# Patient Record
Sex: FEMALE | URBAN - METROPOLITAN AREA
[De-identification: names, ages, dates, MRNs, and addresses within clinical notes are randomized per-mention and may not be internally consistent; named-entity substitution may affect disease eponyms.]

---

## 2020-08-13 ENCOUNTER — LAB OUTSIDE AN ENCOUNTER (OUTPATIENT)
Dept: URBAN - METROPOLITAN AREA CLINIC 92 | Facility: CLINIC | Age: 65
End: 2020-08-13

## 2020-08-13 ENCOUNTER — OFFICE VISIT (OUTPATIENT)
Dept: URBAN - METROPOLITAN AREA TELEHEALTH 2 | Facility: TELEHEALTH | Age: 65
End: 2020-08-13
Payer: MEDICARE

## 2020-08-13 DIAGNOSIS — D50.8 ANEMIA, DUE TO INADEQUATE IRON INTAKE: ICD-10-CM

## 2020-08-13 DIAGNOSIS — K64.8 BLEEDING INTERNAL HEMORRHOIDS: ICD-10-CM

## 2020-08-13 DIAGNOSIS — R10.13 DYSPEPSIA: ICD-10-CM

## 2020-08-13 DIAGNOSIS — Z12.11 SCREENING FOR COLON CANCER: ICD-10-CM

## 2020-08-13 DIAGNOSIS — K21.9 GERD (GASTROESOPHAGEAL REFLUX DISEASE): ICD-10-CM

## 2020-08-13 PROCEDURE — 99243 OFF/OP CNSLTJ NEW/EST LOW 30: CPT | Performed by: INTERNAL MEDICINE

## 2020-08-13 PROCEDURE — G8427 DOCREV CUR MEDS BY ELIG CLIN: HCPCS | Performed by: INTERNAL MEDICINE

## 2020-08-13 PROCEDURE — 3017F COLORECTAL CA SCREEN DOC REV: CPT | Performed by: INTERNAL MEDICINE

## 2020-08-13 PROCEDURE — G8482 FLU IMMUNIZE ORDER/ADMIN: HCPCS | Performed by: INTERNAL MEDICINE

## 2020-08-13 PROCEDURE — G9622 NO UNHEAL ETOH USER: HCPCS | Performed by: INTERNAL MEDICINE

## 2020-08-13 PROCEDURE — G8420 CALC BMI NORM PARAMETERS: HCPCS | Performed by: INTERNAL MEDICINE

## 2020-08-13 PROCEDURE — 99203 OFFICE O/P NEW LOW 30 MIN: CPT | Performed by: INTERNAL MEDICINE

## 2020-08-13 PROCEDURE — G9903 PT SCRN TBCO ID AS NON USER: HCPCS | Performed by: INTERNAL MEDICINE

## 2020-08-13 PROCEDURE — 1036F TOBACCO NON-USER: CPT | Performed by: INTERNAL MEDICINE

## 2020-08-13 RX ORDER — LEVOTHYROXINE SODIUM 50 UG/1
1 TABLET IN THE MORNING ON AN EMPTY STOMACH TABLET ORAL ONCE A DAY
Status: ACTIVE | COMMUNITY

## 2020-08-13 RX ORDER — PANTOPRAZOLE SODIUM 40 MG/1
1 TABLET TABLET, DELAYED RELEASE ORAL BID
Qty: 60 TABLET | Refills: 11 | OUTPATIENT
Start: 2020-08-13

## 2020-08-13 NOTE — HPI-OTHER HISTORIES
Pt Sita is here for evaluation for anemia. . Today on 8/13/2020, pt reports that she has hemorrhoids, which do bleed on occasion, which she believes that is the cause of her sxs.  She has constant heartburn, especially she lays down.  Feels the reflux up to her throat and can trigger her asthma.  Her PCP started her on protonix 20mg, BID, it helped somewhat.  She read that it can be harmful, so she reduced the dose to nightly only.  No nausea, occasional trouble swallowing, has to drink water to get the food down.  1 Ibuprfen about 1 times per month.  Occasional left sided abdominal pain, no precipitating factors, very mild.  Allergy to dairy.  Was found to have Hgb of 9.8 (1 week ago).  She was having signficant amount of rectal bleeding.  Lot of fatigue, very low energy. . Had colonoscopy about 6 years ago, was recommended to return in 5-10 years.  . PMH: mild asthma, hypothyroid SH: No smoke/etoh FH: No colon cancer/stomach cancer .

## 2020-08-20 ENCOUNTER — LAB OUTSIDE AN ENCOUNTER (OUTPATIENT)
Dept: URBAN - METROPOLITAN AREA CLINIC 92 | Facility: CLINIC | Age: 65
End: 2020-08-20

## 2020-09-17 ENCOUNTER — TELEPHONE ENCOUNTER (OUTPATIENT)
Dept: URBAN - METROPOLITAN AREA CLINIC 96 | Facility: CLINIC | Age: 65
End: 2020-09-17

## 2020-09-17 ENCOUNTER — TELEPHONE ENCOUNTER (OUTPATIENT)
Dept: URBAN - METROPOLITAN AREA CLINIC 92 | Facility: CLINIC | Age: 65
End: 2020-09-17

## 2020-09-17 RX ORDER — PANTOPRAZOLE SODIUM 40 MG/1
1 TABLET TABLET, DELAYED RELEASE ORAL BID
Qty: 60 TABLET | Refills: 11 | Status: ACTIVE | COMMUNITY
Start: 2020-08-13

## 2020-09-17 RX ORDER — SODIUM, POTASSIUM,MAG SULFATES 17.5-3.13G
354ML SOLUTION, RECONSTITUTED, ORAL ORAL
Qty: 354 ML | Refills: 0 | OUTPATIENT
Start: 2020-09-18 | End: 2020-09-19

## 2020-09-17 RX ORDER — LEVOTHYROXINE SODIUM 50 UG/1
1 TABLET IN THE MORNING ON AN EMPTY STOMACH TABLET ORAL ONCE A DAY
Status: ACTIVE | COMMUNITY

## 2020-09-18 ENCOUNTER — LAB OUTSIDE AN ENCOUNTER (OUTPATIENT)
Dept: URBAN - METROPOLITAN AREA CLINIC 96 | Facility: CLINIC | Age: 65
End: 2020-09-18

## 2020-10-29 ENCOUNTER — OFFICE VISIT (OUTPATIENT)
Dept: URBAN - METROPOLITAN AREA SURGERY CENTER 18 | Facility: SURGERY CENTER | Age: 65
End: 2020-10-29
Payer: MEDICARE

## 2020-10-29 ENCOUNTER — CLAIMS CREATED FROM THE CLAIM WINDOW (OUTPATIENT)
Dept: URBAN - METROPOLITAN AREA CLINIC 4 | Facility: CLINIC | Age: 65
End: 2020-10-29
Payer: MEDICARE

## 2020-10-29 DIAGNOSIS — K21.9 ACID REFLUX: ICD-10-CM

## 2020-10-29 DIAGNOSIS — Z12.11 COLON CANCER SCREENING: ICD-10-CM

## 2020-10-29 DIAGNOSIS — K31.89 OTHER DISEASES OF STOMACH AND DUODENUM: ICD-10-CM

## 2020-10-29 DIAGNOSIS — K21.0 GASTRO-ESOPHAGEAL REFLUX DISEASE WITH ESOPHAGITIS: ICD-10-CM

## 2020-10-29 DIAGNOSIS — K31.89 ACQUIRED DEFORMITY OF DUODENUM: ICD-10-CM

## 2020-10-29 PROCEDURE — 43239 EGD BIOPSY SINGLE/MULTIPLE: CPT | Performed by: INTERNAL MEDICINE

## 2020-10-29 PROCEDURE — G0121 COLON CA SCRN NOT HI RSK IND: HCPCS | Performed by: INTERNAL MEDICINE

## 2020-10-29 PROCEDURE — G8907 PT DOC NO EVENTS ON DISCHARG: HCPCS | Performed by: INTERNAL MEDICINE

## 2020-10-29 PROCEDURE — 88312 SPECIAL STAINS GROUP 1: CPT | Performed by: PATHOLOGY

## 2020-10-29 PROCEDURE — 88305 TISSUE EXAM BY PATHOLOGIST: CPT | Performed by: PATHOLOGY

## 2020-10-29 PROCEDURE — G9935 CANC NOT DETECTD DURING SRCN: HCPCS | Performed by: INTERNAL MEDICINE

## 2020-10-29 RX ORDER — PANTOPRAZOLE SODIUM 40 MG/1
1 TABLET TABLET, DELAYED RELEASE ORAL BID
Qty: 60 TABLET | Refills: 11 | Status: ACTIVE | COMMUNITY
Start: 2020-08-13

## 2020-10-29 RX ORDER — LEVOTHYROXINE SODIUM 50 UG/1
1 TABLET IN THE MORNING ON AN EMPTY STOMACH TABLET ORAL ONCE A DAY
Status: ACTIVE | COMMUNITY

## 2020-11-02 ENCOUNTER — TELEPHONE ENCOUNTER (OUTPATIENT)
Dept: URBAN - METROPOLITAN AREA CLINIC 92 | Facility: CLINIC | Age: 65
End: 2020-11-02

## 2020-11-05 ENCOUNTER — TELEPHONE ENCOUNTER (OUTPATIENT)
Dept: URBAN - METROPOLITAN AREA CLINIC 92 | Facility: CLINIC | Age: 65
End: 2020-11-05

## 2020-11-09 ENCOUNTER — LAB OUTSIDE AN ENCOUNTER (OUTPATIENT)
Dept: URBAN - METROPOLITAN AREA TELEHEALTH 2 | Facility: TELEHEALTH | Age: 65
End: 2020-11-09

## 2020-11-09 ENCOUNTER — OFFICE VISIT (OUTPATIENT)
Dept: URBAN - METROPOLITAN AREA TELEHEALTH 2 | Facility: TELEHEALTH | Age: 65
End: 2020-11-09
Payer: MEDICARE

## 2020-11-09 DIAGNOSIS — K21.9 GERD (GASTROESOPHAGEAL REFLUX DISEASE): ICD-10-CM

## 2020-11-09 DIAGNOSIS — K62.3 RECTAL PROLAPSE: ICD-10-CM

## 2020-11-09 DIAGNOSIS — K62.5 RECTAL BLEED: ICD-10-CM

## 2020-11-09 DIAGNOSIS — K20.90 ESOPHAGITIS: ICD-10-CM

## 2020-11-09 PROCEDURE — 99215 OFFICE O/P EST HI 40 MIN: CPT | Performed by: INTERNAL MEDICINE

## 2020-11-09 PROCEDURE — G8420 CALC BMI NORM PARAMETERS: HCPCS | Performed by: INTERNAL MEDICINE

## 2020-11-09 PROCEDURE — 3017F COLORECTAL CA SCREEN DOC REV: CPT | Performed by: INTERNAL MEDICINE

## 2020-11-09 PROCEDURE — G9622 NO UNHEAL ETOH USER: HCPCS | Performed by: INTERNAL MEDICINE

## 2020-11-09 PROCEDURE — G8427 DOCREV CUR MEDS BY ELIG CLIN: HCPCS | Performed by: INTERNAL MEDICINE

## 2020-11-09 PROCEDURE — G9903 PT SCRN TBCO ID AS NON USER: HCPCS | Performed by: INTERNAL MEDICINE

## 2020-11-09 PROCEDURE — 99214 OFFICE O/P EST MOD 30 MIN: CPT | Performed by: INTERNAL MEDICINE

## 2020-11-09 PROCEDURE — 1036F TOBACCO NON-USER: CPT | Performed by: INTERNAL MEDICINE

## 2020-11-09 PROCEDURE — G8482 FLU IMMUNIZE ORDER/ADMIN: HCPCS | Performed by: INTERNAL MEDICINE

## 2020-11-09 RX ORDER — PANTOPRAZOLE SODIUM 40 MG/1
1 TABLET TABLET, DELAYED RELEASE ORAL BID
Qty: 60 TABLET | Refills: 11 | Status: ACTIVE | COMMUNITY
Start: 2020-08-13

## 2020-11-09 RX ORDER — LEVOTHYROXINE SODIUM 50 UG/1
1 TABLET IN THE MORNING ON AN EMPTY STOMACH TABLET ORAL ONCE A DAY
Status: ACTIVE | COMMUNITY

## 2020-11-09 NOTE — HPI-TODAY'S VISIT:
Pt Sita is here for evaluation for anemia. . Pt is referred by Dr. Shweta Wray (daughter). . Previously on 8/13/2020, pt reports that she has hemorrhoids, which do bleed on occasion, which she believes that is the cause of her sxs.  She has constant heartburn, especially she lays down.  Feels the reflux up to her throat and can trigger her asthma.  Her PCP started her on protonix 20mg, BID, it helped somewhat.  She read that it can be harmful, so she reduced the dose to nightly only.  No nausea, occasional trouble swallowing, has to drink water to get the food down.  1 Ibuprfen about 1 times per month.  Occasional left sided abdominal pain, no precipitating factors, very mild.  Allergy to dairy.  Was found to have Hgb of 9.8 (1 week ago).  She was having significant amount of rectal bleeding.  Lot of fatigue, very low energy. . Had colonoscopy about 6 years ago, was recommended to return in 5-10 years. . Today on 11/9/2020, pt report that she had EGD/Colonoscopy.  The EGD revealed Grade C esophagitis.  Since starting protonix 40mg BID, she has seen great improvement in her reflux.  Having lot of rectal bleeding, presumed from the rectal prolapse. . PMH: mild asthma, hypothyroid SH: No smoke/etoh FH: No colon cancer/stomach cancer .  EGD: 10/2020 - Erythematous mucosa in the stomach. Biopsied. - A single gastric polyp. Biopsied. - Large hiatal hernia. - LA Grade C reflux esophagitis. Rule out Alfredo's esophagus. Biopsied. Normal examined duodenum. Biopsied. . (A) Duodenum, Biopsy: NO SIGNIFICANT ABNORMALITY. (B) Stomach, Biopsy: NO SIGNIFICANT ABNORMALITY. (C) Gastroesophageal Junction, Biopsy: SQUAMOUS MUCOSA WITH REFLUX-TYPE CHANGES; NO COLUMNAR MUCOSA IDENTIFIED. No Evidence of Alfredo's Esophagus or Eosinophilic Esophagitis. Negative for Infectious organisms, Dysplasia or Malignancy. . Colonoscopy: 10/2020 The terminal ileum appeared normal. Findings: : Normal Terminal Ileum The entire examined colon appeared normal. The perianal exam was abnormal, with likely rectal prolapse present. - Repeat colonoscopy in 5-10 years for screening purposes. .

## 2020-12-14 ENCOUNTER — OFFICE VISIT (OUTPATIENT)
Dept: URBAN - METROPOLITAN AREA TELEHEALTH 2 | Facility: TELEHEALTH | Age: 65
End: 2020-12-14
Payer: MEDICARE

## 2020-12-14 ENCOUNTER — DASHBOARD ENCOUNTERS (OUTPATIENT)
Age: 65
End: 2020-12-14

## 2020-12-14 DIAGNOSIS — K62.3 RECTAL PROLAPSE: ICD-10-CM

## 2020-12-14 DIAGNOSIS — D50.9 IRON DEFICIENCY ANEMIA: ICD-10-CM

## 2020-12-14 DIAGNOSIS — K62.89 RECTAL PAIN: ICD-10-CM

## 2020-12-14 DIAGNOSIS — K21.9 GERD (GASTROESOPHAGEAL REFLUX DISEASE): ICD-10-CM

## 2020-12-14 DIAGNOSIS — R10.13 DYSPEPSIA: ICD-10-CM

## 2020-12-14 DIAGNOSIS — K20.90 ESOPHAGITIS: ICD-10-CM

## 2020-12-14 PROCEDURE — G8482 FLU IMMUNIZE ORDER/ADMIN: HCPCS | Performed by: INTERNAL MEDICINE

## 2020-12-14 PROCEDURE — G9903 PT SCRN TBCO ID AS NON USER: HCPCS | Performed by: INTERNAL MEDICINE

## 2020-12-14 PROCEDURE — 1036F TOBACCO NON-USER: CPT | Performed by: INTERNAL MEDICINE

## 2020-12-14 PROCEDURE — 3017F COLORECTAL CA SCREEN DOC REV: CPT | Performed by: INTERNAL MEDICINE

## 2020-12-14 PROCEDURE — 99214 OFFICE O/P EST MOD 30 MIN: CPT | Performed by: INTERNAL MEDICINE

## 2020-12-14 PROCEDURE — G8427 DOCREV CUR MEDS BY ELIG CLIN: HCPCS | Performed by: INTERNAL MEDICINE

## 2020-12-14 PROCEDURE — G8420 CALC BMI NORM PARAMETERS: HCPCS | Performed by: INTERNAL MEDICINE

## 2020-12-14 PROCEDURE — G9622 NO UNHEAL ETOH USER: HCPCS | Performed by: INTERNAL MEDICINE

## 2020-12-14 RX ORDER — LEVOTHYROXINE SODIUM 50 UG/1
1 TABLET IN THE MORNING ON AN EMPTY STOMACH TABLET ORAL ONCE A DAY
Status: ACTIVE | COMMUNITY

## 2020-12-14 RX ORDER — PANTOPRAZOLE SODIUM 40 MG/1
1 TABLET TABLET, DELAYED RELEASE ORAL BID
Qty: 60 TABLET | Refills: 11 | Status: ACTIVE | COMMUNITY
Start: 2020-08-13

## 2020-12-14 NOTE — HPI-TODAY'S VISIT:
Pt Sita is here for evaluation for anemia. . Pt is referred by Dr. Shweta Wray (daughter). . Previously on 8/13/2020, pt reports that she has hemorrhoids, which do bleed on occasion, which she believes that is the cause of her sxs.  She has constant heartburn, especially she lays down.  Feels the reflux up to her throat and can trigger her asthma.  Her PCP started her on protonix 20mg, BID, it helped somewhat.  She read that it can be harmful, so she reduced the dose to nightly only.  No nausea, occasional trouble swallowing, has to drink water to get the food down.  1 Ibuprfen about 1 times per month.  Occasional left sided abdominal pain, no precipitating factors, very mild.  Allergy to dairy.  Was found to have Hgb of 9.8 (1 week ago).  She was having significant amount of rectal bleeding.  Lot of fatigue, very low energy. . Had colonoscopy about 6 years ago, was recommended to return in 5-10 years. . Previously on 11/9/2020, pt report that she had EGD/Colonoscopy.  The EGD revealed Grade C esophagitis.  Since starting protonix 40mg BID, she has seen great improvement in her reflux.  Having lot of rectal bleeding, presumed from the rectal prolapse. . Today on 12/14/2020 pt reports that she has lot of pain at time of defecation, taking mineral oil, Metamucil, water.  Having a lot of rectal swelling.  She has been soaking in sitz baths.  Stool is very hard shanell at the onset. . PMH: mild asthma, hypothyroid SH: No smoke/etoh FH: No colon cancer/stomach cancer . EGD: 10/2020 - Erythematous mucosa in the stomach. Biopsied. - A single gastric polyp. Biopsied. - Large hiatal hernia. - LA Grade C reflux esophagitis. Rule out Alfredo's esophagus. Biopsied. Normal examined duodenum. Biopsied. . (A) Duodenum, Biopsy: NO SIGNIFICANT ABNORMALITY. (B) Stomach, Biopsy: NO SIGNIFICANT ABNORMALITY. (C) Gastroesophageal Junction, Biopsy: SQUAMOUS MUCOSA WITH REFLUX-TYPE CHANGES; NO COLUMNAR MUCOSA IDENTIFIED. No Evidence of Alfredo's Esophagus or Eosinophilic Esophagitis. Negative for Infectious organisms, Dysplasia or Malignancy. . Colonoscopy: 10/2020 The terminal ileum appeared normal. Findings: : Normal Terminal Ileum The entire examined colon appeared normal. The perianal exam was abnormal, with likely rectal prolapse present. - Repeat colonoscopy in 5-10 years for screening purposes. .

## 2023-03-06 NOTE — PHYSICAL EXAM MUSCULOSKELETAL:
normal gait and station , no tenderness or deformities present
warm and dry/color normal/normal/no rashes/no ulcers

## 2023-07-20 NOTE — PHYSICAL EXAM CARDIOVASCULAR:
no edema, no murmurs, regular rate and rhythm Bactrim Counseling:  I discussed with the patient the risks of sulfa antibiotics including but not limited to GI upset, allergic reaction, drug rash, diarrhea, dizziness, photosensitivity, and yeast infections.  Rarely, more serious reactions can occur including but not limited to aplastic anemia, agranulocytosis, methemoglobinemia, blood dyscrasias, liver or kidney failure, lung infiltrates or desquamative/blistering drug rashes.

## 2024-06-14 ENCOUNTER — P2P PATIENT RECORD (OUTPATIENT)
Age: 69
End: 2024-06-14

## 2024-07-02 ENCOUNTER — LAB OUTSIDE AN ENCOUNTER (OUTPATIENT)
Dept: URBAN - METROPOLITAN AREA CLINIC 96 | Facility: CLINIC | Age: 69
End: 2024-07-02

## 2024-07-02 ENCOUNTER — OFFICE VISIT (OUTPATIENT)
Dept: URBAN - METROPOLITAN AREA CLINIC 96 | Facility: CLINIC | Age: 69
End: 2024-07-02
Payer: MEDICARE

## 2024-07-02 VITALS
BODY MASS INDEX: 25.76 KG/M2 | HEIGHT: 62 IN | RESPIRATION RATE: 18 BRPM | SYSTOLIC BLOOD PRESSURE: 105 MMHG | WEIGHT: 140 LBS | DIASTOLIC BLOOD PRESSURE: 72 MMHG | TEMPERATURE: 97.7 F | HEART RATE: 78 BPM

## 2024-07-02 DIAGNOSIS — R10.13 DYSPEPSIA: ICD-10-CM

## 2024-07-02 DIAGNOSIS — D50.8 ACHLORHYDRIC ANEMIA: ICD-10-CM

## 2024-07-02 DIAGNOSIS — K21.9 GERD (GASTROESOPHAGEAL REFLUX DISEASE): ICD-10-CM

## 2024-07-02 PROCEDURE — 99213 OFFICE O/P EST LOW 20 MIN: CPT | Performed by: INTERNAL MEDICINE

## 2024-07-02 RX ORDER — PANTOPRAZOLE SODIUM 40 MG/1
1 TABLET TABLET, DELAYED RELEASE ORAL BID
Qty: 60 TABLET | Refills: 11 | Status: ACTIVE | COMMUNITY
Start: 2020-08-13

## 2024-07-02 RX ORDER — FAMOTIDINE 40 MG/1
1 TAB TABLET, FILM COATED ORAL EVERY 12 HOURS
Qty: 180 TABLET | Refills: 4 | OUTPATIENT
Start: 2024-07-02

## 2024-07-02 RX ORDER — PANTOPRAZOLE SODIUM 40 MG/1
1 TABLET TABLET, DELAYED RELEASE ORAL EVERY 12 HOURS
Qty: 180 TABLET | Refills: 4 | OUTPATIENT
Start: 2024-07-02

## 2024-07-02 RX ORDER — LEVOTHYROXINE SODIUM 50 UG/1
1 TABLET IN THE MORNING ON AN EMPTY STOMACH TABLET ORAL ONCE A DAY
Status: ACTIVE | COMMUNITY

## 2024-07-02 RX ORDER — ROSUVASTATIN CALCIUM 5 MG/1
1 TABLET TABLET, COATED ORAL ONCE A DAY
Status: ACTIVE | COMMUNITY

## 2024-07-02 RX ORDER — LOSARTAN POTASSIUM 25 MG/1
1 TABLET TABLET, FILM COATED ORAL ONCE A DAY
Status: ACTIVE | COMMUNITY

## 2024-07-02 RX ORDER — DILTIAZEM HYDROCHLORIDE 300 MG/1
1 CAPSULE CAPSULE, EXTENDED RELEASE ORAL ONCE A DAY
Status: ACTIVE | COMMUNITY

## 2024-07-02 NOTE — HPI-TODAY'S VISIT:
Pt Sita is here for evaluation for anemia. . Pt is referred by Dr. Shweta Wray (daughter). . Previously on 8/13/2020, pt reports that she has hemorrhoids, which do bleed on occasion, which she believes that is the cause of her sxs.  She has constant heartburn, especially she lays down.  Feels the reflux up to her throat and can trigger her asthma.  Her PCP started her on protonix 20mg, BID, it helped somewhat.  She read that it can be harmful, so she reduced the dose to nightly only.  No nausea, occasional trouble swallowing, has to drink water to get the food down.  1 Ibuprfen about 1 times per month.  Occasional left sided abdominal pain, no precipitating factors, very mild.  Allergy to dairy.  Was found to have Hgb of 9.8 (1 week ago).  She was having significant amount of rectal bleeding.  Lot of fatigue, very low energy. . Had colonoscopy about 6 years ago, was recommended to return in 5-10 years. . Previously on 11/9/2020, pt report that she had EGD/Colonoscopy.  The EGD revealed Grade C esophagitis.  Since starting protonix 40mg BID, she has seen great improvement in her reflux.  Having lot of rectal bleeding, presumed from the rectal prolapse. . Previouslyon 12/14/2020 pt reports that she has lot of pain at time of defecation, taking mineral oil, Metamucil, water.  Having a lot of rectal swelling.  She has been soaking in sitz baths.  Stool is very hard shanell at the onset. . Today on 7/2/2024, pt reports that she her PCP has told her she has anemia.  Has GERD, worsened when she lays down. . PMH: mild asthma, hypothyroid SH: No smoke/etoh FH: No colon cancer/stomach cancer . EGD: 10/2020 - Erythematous mucosa in the stomach. Biopsied. - A single gastric polyp. Biopsied. - Large hiatal hernia. - LA Grade C reflux esophagitis. Rule out Alfredo's esophagus. Biopsied. Normal examined duodenum. Biopsied. . (A) Duodenum, Biopsy: NO SIGNIFICANT ABNORMALITY. (B) Stomach, Biopsy: NO SIGNIFICANT ABNORMALITY. (C) Gastroesophageal Junction, Biopsy: SQUAMOUS MUCOSA WITH REFLUX-TYPE CHANGES; NO COLUMNAR MUCOSA IDENTIFIED. No Evidence of Alfredo's Esophagus or Eosinophilic Esophagitis. Negative for Infectious organisms, Dysplasia or Malignancy. . Colonoscopy: 10/2020 The terminal ileum appeared normal. Findings: : Normal Terminal Ileum The entire examined colon appeared normal. The perianal exam was abnormal, with likely rectal prolapse present. - Repeat colonoscopy in 5-10 years for screening purposes. .  WBC 6.3 07/28/2023 HGB 12.5 07/28/2023 HCT 40.0 07/28/2023  07/28/2023 CHOL 132 10/24/2023 TRIG 86 10/24/2023 HDL 58 10/24/2023 ALT 14 10/24/2023 AST 15 10/24/2023  10/24/2023 K 5.1 10/24/2023  (H) 10/24/2023 CREATININE 0.78 10/24/2023 BUN 9 10/24/2023 CO2 25 10/24/2023 TSH 1.94 10/24/2023 MICROALBUR 1.0 10/12/2022

## 2024-07-11 ENCOUNTER — TELEPHONE ENCOUNTER (OUTPATIENT)
Dept: URBAN - METROPOLITAN AREA CLINIC 96 | Facility: CLINIC | Age: 69
End: 2024-07-11

## 2024-08-19 ENCOUNTER — TELEPHONE ENCOUNTER (OUTPATIENT)
Dept: URBAN - METROPOLITAN AREA CLINIC 96 | Facility: CLINIC | Age: 69
End: 2024-08-19

## 2024-08-19 RX ORDER — FAMOTIDINE 40 MG/1
1 TAB TABLET, FILM COATED ORAL EVERY 12 HOURS
Qty: 180 TABLET | Refills: 4
Start: 2024-07-02

## 2024-08-19 RX ORDER — PANTOPRAZOLE SODIUM 40 MG/1
1 TABLET TABLET, DELAYED RELEASE ORAL EVERY 12 HOURS
Qty: 180 TABLET | Refills: 4
Start: 2024-07-02

## 2024-08-22 ENCOUNTER — OFFICE VISIT (OUTPATIENT)
Dept: URBAN - METROPOLITAN AREA SURGERY CENTER 18 | Facility: SURGERY CENTER | Age: 69
End: 2024-08-22
Payer: MEDICARE

## 2024-08-22 ENCOUNTER — P2P PATIENT RECORD (OUTPATIENT)
Age: 69
End: 2024-08-22

## 2024-08-22 ENCOUNTER — CLAIMS CREATED FROM THE CLAIM WINDOW (OUTPATIENT)
Dept: URBAN - METROPOLITAN AREA CLINIC 4 | Facility: CLINIC | Age: 69
End: 2024-08-22
Payer: MEDICARE

## 2024-08-22 ENCOUNTER — TELEPHONE ENCOUNTER (OUTPATIENT)
Dept: URBAN - METROPOLITAN AREA CLINIC 96 | Facility: CLINIC | Age: 69
End: 2024-08-22

## 2024-08-22 DIAGNOSIS — K29.70 GASTRITIS: ICD-10-CM

## 2024-08-22 DIAGNOSIS — K21.9 GASTRO-ESOPHAGEAL REFLUX DISEASE WITHOUT ESOPHAGITIS: ICD-10-CM

## 2024-08-22 DIAGNOSIS — K29.70 GASTRITIS, UNSPECIFIED, WITHOUT BLEEDING: ICD-10-CM

## 2024-08-22 DIAGNOSIS — K44.9 HIATAL HERNIA: ICD-10-CM

## 2024-08-22 DIAGNOSIS — R10.13 ABDOMINAL DISCOMFORT, EPIGASTRIC: ICD-10-CM

## 2024-08-22 DIAGNOSIS — K21.9 ACID REFLUX: ICD-10-CM

## 2024-08-22 DIAGNOSIS — K31.89 OTHER DISEASES OF STOMACH AND DUODENUM: ICD-10-CM

## 2024-08-22 DIAGNOSIS — K21.9 GERD: ICD-10-CM

## 2024-08-22 PROCEDURE — 88312 SPECIAL STAINS GROUP 1: CPT | Performed by: PATHOLOGY

## 2024-08-22 PROCEDURE — 43239 EGD BIOPSY SINGLE/MULTIPLE: CPT | Performed by: INTERNAL MEDICINE

## 2024-08-22 PROCEDURE — 00731 ANES UPR GI NDSC PX NOS: CPT | Performed by: NURSE ANESTHETIST, CERTIFIED REGISTERED

## 2024-08-22 PROCEDURE — 88305 TISSUE EXAM BY PATHOLOGIST: CPT | Performed by: PATHOLOGY

## 2024-08-22 RX ORDER — PANTOPRAZOLE SODIUM 40 MG/1
1 TABLET TABLET, DELAYED RELEASE ORAL EVERY 12 HOURS
Qty: 180 TABLET | Refills: 4 | Status: ACTIVE | COMMUNITY
Start: 2024-07-02

## 2024-08-22 RX ORDER — FAMOTIDINE 40 MG/1
1 TAB TABLET, FILM COATED ORAL EVERY 12 HOURS
Qty: 180 TABLET | Refills: 4 | Status: ACTIVE | COMMUNITY
Start: 2024-07-02

## 2024-08-22 RX ORDER — LEVOTHYROXINE SODIUM 50 UG/1
1 TABLET IN THE MORNING ON AN EMPTY STOMACH TABLET ORAL ONCE A DAY
Status: ACTIVE | COMMUNITY

## 2024-08-22 RX ORDER — PANTOPRAZOLE SODIUM 40 MG/1
1 TABLET TABLET, DELAYED RELEASE ORAL EVERY 12 HOURS
Qty: 60 TABLET | Refills: 11 | OUTPATIENT
Start: 2024-08-22

## 2024-08-22 RX ORDER — ROSUVASTATIN CALCIUM 5 MG/1
1 TABLET TABLET, COATED ORAL ONCE A DAY
Status: ACTIVE | COMMUNITY

## 2024-08-22 RX ORDER — LOSARTAN POTASSIUM 25 MG/1
1 TABLET TABLET, FILM COATED ORAL ONCE A DAY
Status: ACTIVE | COMMUNITY

## 2024-08-22 RX ORDER — DILTIAZEM HYDROCHLORIDE 300 MG/1
1 CAPSULE CAPSULE, EXTENDED RELEASE ORAL ONCE A DAY
Status: ACTIVE | COMMUNITY

## 2024-08-22 RX ORDER — FAMOTIDINE 40 MG/1
1 TAB TABLET, FILM COATED ORAL EVERY 12 HOURS
Qty: 180 TABLET | Refills: 4 | OUTPATIENT
Start: 2024-08-22

## 2024-08-22 RX ORDER — PANTOPRAZOLE SODIUM 40 MG/1
1 TABLET TABLET, DELAYED RELEASE ORAL EVERY 12 HOURS
Qty: 180 TABLET | Refills: 4 | OUTPATIENT
Start: 2024-08-22

## 2024-08-22 RX ORDER — PANTOPRAZOLE SODIUM 40 MG/1
1 TABLET TABLET, DELAYED RELEASE ORAL BID
Qty: 60 TABLET | Refills: 11 | Status: ACTIVE | COMMUNITY
Start: 2020-08-13

## 2024-10-27 ENCOUNTER — P2P PATIENT RECORD (OUTPATIENT)
Age: 69
End: 2024-10-27

## 2024-11-19 ENCOUNTER — LAB OUTSIDE AN ENCOUNTER (OUTPATIENT)
Dept: URBAN - METROPOLITAN AREA CLINIC 96 | Facility: CLINIC | Age: 69
End: 2024-11-19

## 2024-11-19 ENCOUNTER — TELEPHONE ENCOUNTER (OUTPATIENT)
Dept: URBAN - METROPOLITAN AREA CLINIC 96 | Facility: CLINIC | Age: 69
End: 2024-11-19

## 2024-12-05 ENCOUNTER — CLAIMS CREATED FROM THE CLAIM WINDOW (OUTPATIENT)
Dept: URBAN - METROPOLITAN AREA CLINIC 4 | Facility: CLINIC | Age: 69
End: 2024-12-05
Payer: MEDICARE

## 2024-12-05 ENCOUNTER — CLAIMS CREATED FROM THE CLAIM WINDOW (OUTPATIENT)
Dept: URBAN - METROPOLITAN AREA SURGERY CENTER 18 | Facility: SURGERY CENTER | Age: 69
End: 2024-12-05
Payer: MEDICARE

## 2024-12-05 DIAGNOSIS — K44.9 HIATAL HERNIA: ICD-10-CM

## 2024-12-05 DIAGNOSIS — K22.10 EROSIVE ESOPHAGITIS: ICD-10-CM

## 2024-12-05 DIAGNOSIS — K29.70 GASTRITIS, UNSPECIFIED, WITHOUT BLEEDING: ICD-10-CM

## 2024-12-05 DIAGNOSIS — K21.9 ESOPHAGEAL REFLUX: ICD-10-CM

## 2024-12-05 DIAGNOSIS — K20.80 ESOPHAGITIS, LOS ANGELES GRADE A: ICD-10-CM

## 2024-12-05 DIAGNOSIS — K30 DYSPEPSIA: ICD-10-CM

## 2024-12-05 DIAGNOSIS — K29.60 ADENOPAPILLOMATOSIS GASTRICA: ICD-10-CM

## 2024-12-05 DIAGNOSIS — K21.00 GASTRO-ESOPHAGEAL REFLUX DISEASE WITH ESOPHAGITIS, WITHOUT BLEEDING: ICD-10-CM

## 2024-12-05 PROCEDURE — 88342 IMHCHEM/IMCYTCHM 1ST ANTB: CPT | Performed by: PATHOLOGY

## 2024-12-05 PROCEDURE — 88312 SPECIAL STAINS GROUP 1: CPT | Performed by: PATHOLOGY

## 2024-12-05 PROCEDURE — 88341 IMHCHEM/IMCYTCHM EA ADD ANTB: CPT | Performed by: PATHOLOGY

## 2024-12-05 PROCEDURE — 43239 EGD BIOPSY SINGLE/MULTIPLE: CPT | Performed by: INTERNAL MEDICINE

## 2024-12-05 PROCEDURE — 88305 TISSUE EXAM BY PATHOLOGIST: CPT | Performed by: PATHOLOGY

## 2024-12-05 PROCEDURE — 00731 ANES UPR GI NDSC PX NOS: CPT | Performed by: NURSE ANESTHETIST, CERTIFIED REGISTERED

## 2024-12-05 RX ORDER — LOSARTAN POTASSIUM 25 MG/1
1 TABLET TABLET, FILM COATED ORAL ONCE A DAY
Status: ACTIVE | COMMUNITY

## 2024-12-05 RX ORDER — PANTOPRAZOLE SODIUM 40 MG/1
1 TABLET TABLET, DELAYED RELEASE ORAL EVERY 12 HOURS
Qty: 180 TABLET | Refills: 4 | Status: ACTIVE | COMMUNITY
Start: 2024-08-22

## 2024-12-05 RX ORDER — FAMOTIDINE 40 MG/1
1 TAB TABLET, FILM COATED ORAL EVERY 12 HOURS
Qty: 180 TABLET | Refills: 4 | Status: ACTIVE | COMMUNITY
Start: 2024-07-02

## 2024-12-05 RX ORDER — PANTOPRAZOLE SODIUM 40 MG/1
1 TABLET TABLET, DELAYED RELEASE ORAL BID
Qty: 60 TABLET | Refills: 11 | Status: ACTIVE | COMMUNITY
Start: 2020-08-13

## 2024-12-05 RX ORDER — LEVOTHYROXINE SODIUM 50 UG/1
1 TABLET IN THE MORNING ON AN EMPTY STOMACH TABLET ORAL ONCE A DAY
Status: ACTIVE | COMMUNITY

## 2024-12-05 RX ORDER — PANTOPRAZOLE SODIUM 40 MG/1
1 TABLET TABLET, DELAYED RELEASE ORAL EVERY 12 HOURS
Qty: 180 TABLET | Refills: 4 | Status: ACTIVE | COMMUNITY
Start: 2024-07-02

## 2024-12-05 RX ORDER — ROSUVASTATIN CALCIUM 5 MG/1
1 TABLET TABLET, COATED ORAL ONCE A DAY
Status: ACTIVE | COMMUNITY

## 2024-12-05 RX ORDER — PANTOPRAZOLE SODIUM 40 MG/1
1 TABLET TABLET, DELAYED RELEASE ORAL EVERY 12 HOURS
Qty: 60 TABLET | Refills: 11 | Status: ACTIVE | COMMUNITY
Start: 2024-08-22

## 2024-12-05 RX ORDER — FAMOTIDINE 40 MG/1
1 TAB TABLET, FILM COATED ORAL EVERY 12 HOURS
Qty: 180 TABLET | Refills: 4 | Status: ACTIVE | COMMUNITY
Start: 2024-08-22

## 2024-12-05 RX ORDER — DILTIAZEM HYDROCHLORIDE 300 MG/1
1 CAPSULE CAPSULE, EXTENDED RELEASE ORAL ONCE A DAY
Status: ACTIVE | COMMUNITY

## 2025-06-05 ENCOUNTER — LAB OUTSIDE AN ENCOUNTER (OUTPATIENT)
Dept: URBAN - METROPOLITAN AREA CLINIC 96 | Facility: CLINIC | Age: 70
End: 2025-06-05

## 2025-06-05 ENCOUNTER — OFFICE VISIT (OUTPATIENT)
Dept: URBAN - METROPOLITAN AREA CLINIC 96 | Facility: CLINIC | Age: 70
End: 2025-06-05
Payer: MEDICARE

## 2025-06-05 DIAGNOSIS — K21.9 GERD (GASTROESOPHAGEAL REFLUX DISEASE): ICD-10-CM

## 2025-06-05 DIAGNOSIS — D50.9 IRON DEFICIENCY ANEMIA: ICD-10-CM

## 2025-06-05 DIAGNOSIS — Z86.0100 PERSONAL HISTORY OF COLONIC POLYPS: ICD-10-CM

## 2025-06-05 PROCEDURE — 99214 OFFICE O/P EST MOD 30 MIN: CPT

## 2025-06-05 RX ORDER — ROSUVASTATIN CALCIUM 5 MG/1
1 TABLET TABLET, COATED ORAL ONCE A DAY
Status: ACTIVE | COMMUNITY

## 2025-06-05 RX ORDER — DILTIAZEM HYDROCHLORIDE 300 MG/1
1 CAPSULE CAPSULE, EXTENDED RELEASE ORAL ONCE A DAY
Status: ACTIVE | COMMUNITY

## 2025-06-05 RX ORDER — LEVOTHYROXINE SODIUM 50 UG/1
1 TABLET IN THE MORNING ON AN EMPTY STOMACH TABLET ORAL ONCE A DAY
Status: ACTIVE | COMMUNITY

## 2025-06-05 RX ORDER — PANTOPRAZOLE SODIUM 40 MG/1
1 TABLET TABLET, DELAYED RELEASE ORAL BID
Qty: 60 TABLET | Refills: 11 | Status: ACTIVE | COMMUNITY
Start: 2020-08-13

## 2025-06-05 RX ORDER — FAMOTIDINE 40 MG/1
1 TAB TABLET, FILM COATED ORAL EVERY 12 HOURS
Qty: 180 TABLET | Refills: 4 | Status: ACTIVE | COMMUNITY
Start: 2024-07-02

## 2025-06-05 RX ORDER — LOSARTAN POTASSIUM 25 MG/1
1 TABLET TABLET, FILM COATED ORAL ONCE A DAY
Status: ACTIVE | COMMUNITY

## 2025-06-05 NOTE — HPI-TODAY'S VISIT:
Pt Sita is here for evaluation for anemia. . Pt is referred by Dr. Shweta Wray (daughter). . Previously on 8/13/2020, pt reports that she has hemorrhoids, which do bleed on occasion, which she believes that is the cause of her sxs.  She has constant heartburn, especially she lays down.  Feels the reflux up to her throat and can trigger her asthma.  Her PCP started her on protonix 20mg, BID, it helped somewhat.  She read that it can be harmful, so she reduced the dose to nightly only.  No nausea, occasional trouble swallowing, has to drink water to get the food down.  1 Ibuprfen about 1 times per month.  Occasional left sided abdominal pain, no precipitating factors, very mild.  Allergy to dairy.  Was found to have Hgb of 9.8 (1 week ago).  She was having significant amount of rectal bleeding.  Lot of fatigue, very low energy. . Had colonoscopy about 6 years ago, was recommended to return in 5-10 years. . Previously on 11/9/2020, pt report that she had EGD/Colonoscopy.  The EGD revealed Grade C esophagitis.  Since starting protonix 40mg BID, she has seen great improvement in her reflux.  Having lot of rectal bleeding, presumed from the rectal prolapse. . Previouslyon 12/14/2020 pt reports that she has lot of pain at time of defecation, taking mineral oil, Metamucil, water.  Having a lot of rectal swelling.  She has been soaking in sitz baths.  Stool is very hard shanell at the onset. . Previously on 7/2/2024, pt reports that she her PCP has told her she has anemia.  Has GERD, worsened when she lays down. . Today on 6/5/2025: PCP told her she had anemia and to follow-up with GI. She was started on oral iron pills.  Also reports increased heartburn and reflux symptoms.  Taking 4-5 stool softener/laxative combination pills per day.  Sensation of incomplete evacuation -- will have multiple small stools per day. Review of [facility] records:  Labs on 5/6/25 - Hgb 10.6 (L), MCV 83.5, Iron 36, %sat 10 (L), Ferritin 9 (L) . PMH: mild asthma, hypothyroid SH: No smoke/etoh FH: No colon cancer/stomach cancer . EGD: 10/2020 - Erythematous mucosa in the stomach. Biopsied. - A single gastric polyp. Biopsied. - Large hiatal hernia. - LA Grade C reflux esophagitis. Rule out Alfredo's esophagus. Biopsied. Normal examined duodenum. Biopsied. . (A) Duodenum, Biopsy: NO SIGNIFICANT ABNORMALITY. (B) Stomach, Biopsy: NO SIGNIFICANT ABNORMALITY. (C) Gastroesophageal Junction, Biopsy: SQUAMOUS MUCOSA WITH REFLUX-TYPE CHANGES; NO COLUMNAR MUCOSA IDENTIFIED. No Evidence of Alfredo's Esophagus or Eosinophilic Esophagitis. Negative for Infectious organisms, Dysplasia or Malignancy. . Colonoscopy: 10/2020 The terminal ileum appeared normal. Findings: : Normal Terminal Ileum The entire examined colon appeared normal. The perianal exam was abnormal, with likely rectal prolapse present. - Repeat colonoscopy in 5-10 years for screening purposes. .  WBC 6.3 07/28/2023 HGB 12.5 07/28/2023 HCT 40.0 07/28/2023  07/28/2023 CHOL 132 10/24/2023 TRIG 86 10/24/2023 HDL 58 10/24/2023 ALT 14 10/24/2023 AST 15 10/24/2023  10/24/2023 K 5.1 10/24/2023  (H) 10/24/2023 CREATININE 0.78 10/24/2023 BUN 9 10/24/2023 CO2 25 10/24/2023 TSH 1.94 10/24/2023 MICROALBUR 1.0 10/12/2022

## 2025-08-28 ENCOUNTER — TELEPHONE ENCOUNTER (OUTPATIENT)
Dept: URBAN - METROPOLITAN AREA CLINIC 96 | Facility: CLINIC | Age: 70
End: 2025-08-28

## 2025-08-28 ENCOUNTER — CLAIMS CREATED FROM THE CLAIM WINDOW (OUTPATIENT)
Dept: URBAN - METROPOLITAN AREA CLINIC 4 | Facility: CLINIC | Age: 70
End: 2025-08-28
Payer: MEDICARE

## 2025-08-28 ENCOUNTER — LAB OUTSIDE AN ENCOUNTER (OUTPATIENT)
Dept: URBAN - METROPOLITAN AREA SURGERY CENTER 18 | Facility: SURGERY CENTER | Age: 70
End: 2025-08-28

## 2025-08-28 DIAGNOSIS — K21.9 GASTRO-ESOPHAGEAL REFLUX DISEASE WITHOUT ESOPHAGITIS: ICD-10-CM

## 2025-08-28 DIAGNOSIS — K31.89 OTHER DISEASES OF STOMACH AND DUODENUM: ICD-10-CM

## 2025-08-28 PROCEDURE — 88305 TISSUE EXAM BY PATHOLOGIST: CPT | Performed by: PATHOLOGY

## 2025-08-28 RX ORDER — LOSARTAN POTASSIUM 25 MG/1
1 TABLET TABLET, FILM COATED ORAL ONCE A DAY
Status: ACTIVE | COMMUNITY

## 2025-08-28 RX ORDER — ROSUVASTATIN CALCIUM 5 MG/1
1 TABLET TABLET, FILM COATED ORAL ONCE A DAY
Status: ACTIVE | COMMUNITY

## 2025-08-28 RX ORDER — DILTIAZEM HYDROCHLORIDE 300 MG/1
1 CAPSULE CAPSULE, EXTENDED RELEASE ORAL ONCE A DAY
Status: ACTIVE | COMMUNITY

## 2025-08-28 RX ORDER — PANTOPRAZOLE SODIUM 40 MG/1
1 TABLET TABLET, DELAYED RELEASE ORAL BID
Qty: 60 TABLET | Refills: 11 | Status: ACTIVE | COMMUNITY
Start: 2020-08-13

## 2025-08-28 RX ORDER — LEVOTHYROXINE SODIUM 50 UG/1
1 TABLET IN THE MORNING ON AN EMPTY STOMACH TABLET ORAL ONCE A DAY
Status: ACTIVE | COMMUNITY

## 2025-08-28 RX ORDER — FAMOTIDINE 40 MG/1
1 TAB TABLET, FILM COATED ORAL EVERY 12 HOURS
Qty: 180 TABLET | Refills: 4 | Status: ACTIVE | COMMUNITY
Start: 2024-07-02

## 2025-08-29 ENCOUNTER — TELEPHONE ENCOUNTER (OUTPATIENT)
Dept: URBAN - METROPOLITAN AREA CLINIC 96 | Facility: CLINIC | Age: 70
End: 2025-08-29